# Patient Record
Sex: FEMALE | Race: WHITE | NOT HISPANIC OR LATINO | Employment: OTHER | URBAN - METROPOLITAN AREA
[De-identification: names, ages, dates, MRNs, and addresses within clinical notes are randomized per-mention and may not be internally consistent; named-entity substitution may affect disease eponyms.]

---

## 2021-07-11 PROBLEM — I10 BENIGN ESSENTIAL HYPERTENSION: Status: ACTIVE | Noted: 2021-07-11

## 2021-07-11 PROBLEM — J12.82 PNEUMONIA DUE TO COVID-19 VIRUS: Status: ACTIVE | Noted: 2021-07-11

## 2021-07-11 PROBLEM — U07.1 COVID-19: Status: ACTIVE | Noted: 2021-07-11

## 2021-07-11 PROBLEM — I48.91 ATRIAL FIBRILLATION: Status: ACTIVE | Noted: 2021-07-11

## 2021-07-11 PROBLEM — I63.9 CEREBROVASCULAR ACCIDENT (CVA): Status: ACTIVE | Noted: 2021-07-11

## 2021-07-11 PROBLEM — U07.1 PNEUMONIA DUE TO COVID-19 VIRUS: Status: ACTIVE | Noted: 2021-07-11

## 2021-07-15 DIAGNOSIS — U07.1 COVID-19 VIRUS DETECTED: ICD-10-CM

## 2021-07-15 PROBLEM — J12.82 PNEUMONIA DUE TO COVID-19 VIRUS: Status: RESOLVED | Noted: 2021-07-11 | Resolved: 2021-07-15

## 2021-08-06 ENCOUNTER — IMMUNIZATION (OUTPATIENT)
Dept: PRIMARY CARE CLINIC | Facility: CLINIC | Age: 71
End: 2021-08-06
Payer: MEDICARE

## 2021-08-06 DIAGNOSIS — Z23 NEED FOR VACCINATION: Primary | ICD-10-CM

## 2022-02-11 ENCOUNTER — IMMUNIZATION (OUTPATIENT)
Dept: PRIMARY CARE CLINIC | Facility: CLINIC | Age: 72
End: 2022-02-11
Payer: MEDICARE

## 2022-02-11 DIAGNOSIS — Z23 NEED FOR VACCINATION: Primary | ICD-10-CM

## 2022-02-11 PROCEDURE — 91305 COVID-19, MRNA, LNP-S, PF, 30 MCG/0.3 ML DOSE VACCINE (PFIZER): CPT | Mod: S$GLB,,, | Performed by: EMERGENCY MEDICINE

## 2022-02-11 PROCEDURE — 91305 COVID-19, MRNA, LNP-S, PF, 30 MCG/0.3 ML DOSE VACCINE (PFIZER): ICD-10-PCS | Mod: S$GLB,,, | Performed by: EMERGENCY MEDICINE

## 2024-06-17 ENCOUNTER — TELEPHONE (OUTPATIENT)
Dept: PODIATRY | Facility: CLINIC | Age: 74
End: 2024-06-17
Payer: MEDICARE

## 2024-06-17 NOTE — TELEPHONE ENCOUNTER
Scheduled her for 10/21/24 at 2:00pm Verbalized understanding and no further issues discussed. ----- Message from Rita Dale LPN sent at 6/17/2024  3:35 PM CDT -----  Regarding: FW: sonia ABURTO w/ Lex same day MRN  [id number]  Contact:  [phone]  I think you schedule the   ----- Message -----  From: Bell Lazaro  Sent: 6/17/2024   3:22 PM CDT  To: [facility]  Subject: sonia ABURTO w/ Lex same day MRN  [id number]#    The patient called requesting to scheduled with podiatry  PT & wife requestign to be scheduled together, both would be NP appts  I was unable to find appt for same day. Please reach out to advise/sched.     No further information provided    Patient can be contacted @# [phone]

## 2024-10-21 ENCOUNTER — OFFICE VISIT (OUTPATIENT)
Dept: PODIATRY | Facility: CLINIC | Age: 74
End: 2024-10-21
Payer: MEDICARE

## 2024-10-21 VITALS
BODY MASS INDEX: 30.56 KG/M2 | SYSTOLIC BLOOD PRESSURE: 114 MMHG | WEIGHT: 201.63 LBS | HEIGHT: 68 IN | HEART RATE: 90 BPM | DIASTOLIC BLOOD PRESSURE: 74 MMHG

## 2024-10-21 DIAGNOSIS — M79.674 PAIN IN TOE OF RIGHT FOOT: ICD-10-CM

## 2024-10-21 DIAGNOSIS — M20.41 HAMMERTOE OF SECOND TOE OF RIGHT FOOT: ICD-10-CM

## 2024-10-21 DIAGNOSIS — L60.0 ONYCHOMYCOSIS WITH INGROWN TOENAIL: Primary | ICD-10-CM

## 2024-10-21 DIAGNOSIS — M20.42 HAMMER TOES OF BOTH FEET: ICD-10-CM

## 2024-10-21 DIAGNOSIS — M20.41 HAMMER TOES OF BOTH FEET: ICD-10-CM

## 2024-10-21 DIAGNOSIS — B35.1 ONYCHOMYCOSIS WITH INGROWN TOENAIL: Primary | ICD-10-CM

## 2024-10-21 DIAGNOSIS — L84 CORN OR CALLUS: ICD-10-CM

## 2024-10-21 DIAGNOSIS — I69.30 SEQUELA OF THROMBOTIC CEREBROVASCULAR ACCIDENT (CVA): ICD-10-CM

## 2024-10-21 DIAGNOSIS — Z79.01 LONG TERM CURRENT USE OF ANTICOAGULANT: ICD-10-CM

## 2024-10-21 DIAGNOSIS — I87.2 EDEMA OF BOTH LOWER EXTREMITIES DUE TO PERIPHERAL VENOUS INSUFFICIENCY: ICD-10-CM

## 2024-10-21 PROCEDURE — 11056 PARNG/CUTG B9 HYPRKR LES 2-4: CPT | Mod: Q9,S$GLB,, | Performed by: PODIATRIST

## 2024-10-21 PROCEDURE — 1159F MED LIST DOCD IN RCRD: CPT | Mod: CPTII,S$GLB,, | Performed by: PODIATRIST

## 2024-10-21 PROCEDURE — 3074F SYST BP LT 130 MM HG: CPT | Mod: CPTII,S$GLB,, | Performed by: PODIATRIST

## 2024-10-21 PROCEDURE — 99999 PR PBB SHADOW E&M-EST. PATIENT-LVL III: CPT | Mod: PBBFAC,,, | Performed by: PODIATRIST

## 2024-10-21 PROCEDURE — 3008F BODY MASS INDEX DOCD: CPT | Mod: CPTII,S$GLB,, | Performed by: PODIATRIST

## 2024-10-21 PROCEDURE — 3288F FALL RISK ASSESSMENT DOCD: CPT | Mod: CPTII,S$GLB,, | Performed by: PODIATRIST

## 2024-10-21 PROCEDURE — 1125F AMNT PAIN NOTED PAIN PRSNT: CPT | Mod: CPTII,S$GLB,, | Performed by: PODIATRIST

## 2024-10-21 PROCEDURE — 99203 OFFICE O/P NEW LOW 30 MIN: CPT | Mod: 25,S$GLB,, | Performed by: PODIATRIST

## 2024-10-21 PROCEDURE — 11721 DEBRIDE NAIL 6 OR MORE: CPT | Mod: Q9,59,S$GLB, | Performed by: PODIATRIST

## 2024-10-21 PROCEDURE — 3078F DIAST BP <80 MM HG: CPT | Mod: CPTII,S$GLB,, | Performed by: PODIATRIST

## 2024-10-21 PROCEDURE — 1101F PT FALLS ASSESS-DOCD LE1/YR: CPT | Mod: CPTII,S$GLB,, | Performed by: PODIATRIST

## 2024-10-21 RX ORDER — PANTOPRAZOLE SODIUM 40 MG/1
40 TABLET, DELAYED RELEASE ORAL DAILY
COMMUNITY

## 2024-10-21 NOTE — PROGRESS NOTES
Subjective:      Patient ID: Verona Koroma is a 74 y.o. female.    Chief Complaint: Nail Care, Toe Pain (Right foot sore under her toes), and Callouses (Right great toe)    Verona is a 74 y.o. female who presents new to the podiatry clinic, accompanied by , w/ c/o B/L foot pain. Referred from [facility] friend - Rowena Rod & Scotty Abdi, who are both patients here. States has a corn under her toe R foot that hurts; pain level 10/10. Also nail care as they are hurting w/ shoes.    Doesn't drive.    PCP Js Barnard MD July 1st, 2024    Recent injury - broke shoulder R 6 months ago. Had afib after TKR 3 yrs.ago - on Eliquis.  Past Medical History:   Diagnosis Date    Hypertension     Stroke      Patient Active Problem List   Diagnosis    Atrial fibrillation    Benign essential hypertension    Cerebrovascular accident (CVA)      Objective:      Review of Systems   Constitutional: Positive for malaise/fatigue.   Cardiovascular:  Negative for leg swelling.   Skin:  Positive for color change, dry skin and suspicious lesions. Negative for rash.   Musculoskeletal:  Positive for falls, joint pain (fractured humerus 6/21/24), muscle weakness (R UE paralysis & RLE weakness) and myalgias.   Neurological:  Positive for focal weakness, paresthesias and sensory change. Negative for numbness.   Psychiatric/Behavioral:  The patient is not nervous/anxious.      Physical Exam  Vitals reviewed.   Constitutional:       Appearance: She is well-developed. She is obese.   Cardiovascular:      Pulses:           Dorsalis pedis pulses are 1+ on the right side and 1+ on the left side.      Comments: Telangectasias & varicose veins R>L  Musculoskeletal:         General: Tenderness present. No swelling or signs of injury.      Right lower leg: Edema present.      Left lower leg: Edema present.      Right foot: Deformity (hammertoes anabela. 2nd R) present.      Left foot: Deformity (hammertoes w/ underlapping 4th R) present.         Feet:    Feet:      Right foot:      Toenail Condition: Right toenails are long. Fungal disease present.     Left foot:      Toenail Condition: Left toenails are long. Fungal disease present.     Comments: Toenails 1st, 2nd, 3rd, 4th, 5th  B/L are hypertrophic, thickened, dystrophic, discolored.  Tender to distal nail plate pressure, w/out periungual skin abnormality noted.     Skin:     General: Skin is warm and dry.      Capillary Refill: Capillary refill takes 2 to 3 seconds.      Findings: Lesion present. No bruising or erythema.      Comments: Hyperkeratoses distal 4th L, medial 2nd R, IPK R HIPJ.    Edema non-pitting B/L./    Neurological:      Mental Status: She is alert and oriented to person, place, and time.      Motor: Weakness and abnormal muscle tone present.      Gait: Gait abnormal (triangle walker or cane.).   Psychiatric:         Mood and Affect: Mood and affect normal.         Behavior: Behavior normal. Behavior is cooperative.         Assessment:      Encounter Diagnoses   Name Primary?    Long term current use of anticoagulant     Sequela of thrombotic cerebrovascular accident (CVA)     Onychomycosis with ingrown toenail Yes    Edema of both lower extremities due to peripheral venous insufficiency     Hammer toes of both feet     Hammertoe of second toe of right foot     Corn or callus     Pain in toe of right foot        Problem List Items Addressed This Visit    None  Visit Diagnoses       Onychomycosis with ingrown toenail    -  Primary    Relevant Orders    Routine Foot Care    Long term current use of anticoagulant        Relevant Orders    Routine Foot Care    Sequela of thrombotic cerebrovascular accident (CVA)        Edema of both lower extremities due to peripheral venous insufficiency        Relevant Orders    Routine Foot Care    Hammer toes of both feet        Relevant Orders    CREST PADS FOR HOME USE    Hammertoe of second toe of right foot        Corn or callus         "Relevant Orders    Routine Foot Care    Pain in toe of right foot              Plan:       Verona Boyce" was seen today for nail care, toe pain and callouses.    Diagnoses and all orders for this visit:    Onychomycosis with ingrown toenail  -     Routine Foot Care    Long term current use of anticoagulant  -     Routine Foot Care    Sequela of thrombotic cerebrovascular accident (CVA)    Edema of both lower extremities due to peripheral venous insufficiency  -     Routine Foot Care    Hammer toes of both feet  -     CREST PADS FOR HOME USE    Hammertoe of second toe of right foot    Corn or callus  -     Routine Foot Care    Pain in toe of right foot    I counseled the patient on her conditions, their implications & medical mgmt.    - Shoe inspection. Patient instructed on proper foot hygeine. We discussed wearing proper shoe gear, daily foot inspections, never walking without protective shoe gear, never putting sharp instruments to feet, routine podiatric nail visits every 2-3 months.      - W/ patient's permission, nails were aggressively reduced & debrided x 10 to their soft tissue attachment mechanically, removing all offending nail & debris.  I trimmed the corns/ calluses at the above-mentioned location.      Patient relates relief following the procedure.   She will continue to monitor the areas daily, inspect her feet, wear protective shoe gear when ambulatory, moisturizer to maintain skin integrity & follow in this office in approximately 3 months, sooner p.r.n.        A total of 34 mins.was spent on chart review, patient visit & documentation.  "

## 2024-11-04 NOTE — PROCEDURES
Routine Foot Care    Date/Time: 10/21/2024 2:00 PM    Performed by: Rosalinda Dowd DPM  Authorized by: Rosalinda Dowd DPM    Consent Done?:  Yes (Verbal)  Hyperkeratotic Skin Lesions?: Yes    Number of trimmed lesions:  3  Location(s):  Right 2nd Toe, Right 1st Toe and Left 4th Toe    Nail Care Type:  Debride  Location(s): All  (Left 1st Toe, Left 3rd Toe, Left 2nd Toe, Left 4th Toe, Left 5th Toe, Right 1st Toe, Right 2nd Toe, Right 3rd Toe, Right 4th Toe and Right 5th Toe)  Patient tolerance:  Patient tolerated the procedure well with no immediate complications

## 2025-01-24 NOTE — PROGRESS NOTES
Subjective:      Patient ID: Verona Koroma is a 74 y.o. female.    Chief Complaint: Nail Care and Toe Pain (Under toe burn)    Patient is brought in by  for 3 month DM foot exam.  has 8-9 lbs of water weight but did not take fluid pills today. Also has lymphedema puymp but only used it yesterday. R leg swollen. Getting burning on ends of toes w/ walking since stroke; feels like warning on end of toes . Recent hypotension.  Pain level up till 10/10.  10/21/24  Verona is a 74 y.o. female who presents new to the podiatry clinic, accompanied by , w/ c/o B/L foot pain. Referred from [facility] friend - Rowena Velasco & Scotty Abdi, who are both patients here.  has a corn under her toe R foot that hurts; pain level 10/10. Also nail care as they are hurting w/ shoes.    Doesn't drive.    PCP sJ Barnard MD 10/22/24    Recent injury - broke shoulder R 6 months ago. Had afib after TKR 3 yrs.ago - on Eliquis.  Past Medical History:   Diagnosis Date    Hypertension     Stroke      Patient Active Problem List   Diagnosis    Atrial fibrillation    Benign essential hypertension    Cerebrovascular accident (CVA)      Objective:      Review of Systems   Constitutional: Positive for malaise/fatigue.   Cardiovascular:  Negative for leg swelling.   Skin:  Positive for color change, dry skin and suspicious lesions. Negative for rash.   Musculoskeletal:  Positive for falls, joint pain (fractured humerus 6/21/24), muscle weakness (R UE paralysis & RLE weakness) and myalgias.   Neurological:  Positive for focal weakness, paresthesias and sensory change. Negative for numbness.   Psychiatric/Behavioral:  The patient is not nervous/anxious.      Physical Exam  Vitals reviewed.   Constitutional:       Appearance: She is well-developed. She is obese.   Cardiovascular:      Pulses:           Dorsalis pedis pulses are 1+ on the right side and 1+ on the left side.      Comments: Telangectasias & varicose  veins R>>L.    Lymphedema  Musculoskeletal:         General: Tenderness present. No swelling or signs of injury.      Right lower leg: Edema present.      Left lower leg: Edema present.      Right foot: Deformity (hammertoes anabela. 2nd R) present.      Left foot: Deformity (hammertoes w/ underlapping 4th R) present.        Feet:    Feet:      Right foot:      Toenail Condition: Right toenails are long. Fungal disease present.     Left foot:      Toenail Condition: Left toenails are long. Fungal disease present.     Comments: Toenails 1st, 2nd, 3rd, 4th, 5th  B/L are hypertrophic, thickened, dystrophic, discolored.  Tender to distal nail plate pressure, w/out periungual skin abnormality noted.     Skin:     General: Skin is warm and dry.      Capillary Refill: Capillary refill takes 2 to 3 seconds.      Findings: Lesion present. No bruising or erythema.      Comments: Hyperkeratoses distal medial 2nd R, IPK R HIPJ.    Edema non-pitting B/L R>>L.    Neurological:      Mental Status: She is alert and oriented to person, place, and time.      Motor: Weakness and abnormal muscle tone present.      Gait: Gait abnormal (triangle walker or cane.).      Comments: Paresthesias B/L feet w/ no clearly identified trigger or source; no hyperemia.    No TTP & fullness IMS B/L & w/out increase on lateral met.head compression.     Psychiatric:         Mood and Affect: Mood and affect normal.         Behavior: Behavior normal. Behavior is cooperative.         Assessment:      Encounter Diagnoses   Name Primary?    Long term current use of anticoagulant Yes    Atrial fibrillation, unspecified type     History of stroke     Burning sensation of toe and foot     Lymphedema of lower extremity     Hemiplegia as late effect of stroke     Onychomycosis with ingrown toenail        Problem List Items Addressed This Visit          Cardiac/Vascular    Atrial fibrillation     Other Visit Diagnoses       Long term current use of anticoagulant    -   "Primary    Relevant Orders    Routine Foot Care    History of stroke        Relevant Orders    Routine Foot Care    Burning sensation of toe and foot        Lymphedema of lower extremity        Relevant Orders    Routine Foot Care    Hemiplegia as late effect of stroke        Onychomycosis with ingrown toenail        Relevant Orders    Routine Foot Care          Plan:       Verona Boyce" was seen today for nail care and toe pain.    Diagnoses and all orders for this visit:    Long term current use of anticoagulant  -     Routine Foot Care    Atrial fibrillation, unspecified type    History of stroke  -     Routine Foot Care    Burning sensation of toe and foot    Lymphedema of lower extremity  -     Routine Foot Care    Hemiplegia as late effect of stroke    Onychomycosis with ingrown toenail  -     Routine Foot Care    I counseled the patient on her conditions, their implications & medical mgmt.    - Shoe inspection. Patient instructed on proper foot hygeine. We discussed wearing proper shoe gear, daily foot inspections, never walking without protective shoe gear, never putting sharp instruments to feet, annual DM foot exam, sooner prn.    - W/ patient's permission, nails were aggressively reduced & debrided x 10 to their soft tissue attachment mechanically, removing all offending nail & debris.  I trimmed the corns/ calluses at the above-mentioned location.      Patient relates relief following the procedure.   She will continue to monitor the areas daily, inspect her feet, wear protective shoe gear when ambulatory, moisturizer to maintain skin integrity & follow in this office in approximately 3 months, sooner p.r.n.    Dispensed crest pads for hallux & 2nd toe.    Dictation #1  MRN:[id number]  CSN:[id number]     A total of 33 mins.was spent on chart review, patient visit & documentation.  "

## 2025-01-29 ENCOUNTER — OFFICE VISIT (OUTPATIENT)
Dept: PODIATRY | Facility: CLINIC | Age: 75
End: 2025-01-29
Payer: MEDICARE

## 2025-01-29 VITALS
SYSTOLIC BLOOD PRESSURE: 122 MMHG | WEIGHT: 198.63 LBS | DIASTOLIC BLOOD PRESSURE: 69 MMHG | HEIGHT: 68 IN | BODY MASS INDEX: 30.1 KG/M2 | HEART RATE: 88 BPM

## 2025-01-29 DIAGNOSIS — I48.91 ATRIAL FIBRILLATION, UNSPECIFIED TYPE: ICD-10-CM

## 2025-01-29 DIAGNOSIS — B35.1 ONYCHOMYCOSIS WITH INGROWN TOENAIL: ICD-10-CM

## 2025-01-29 DIAGNOSIS — I69.359 HEMIPLEGIA AS LATE EFFECT OF STROKE: ICD-10-CM

## 2025-01-29 DIAGNOSIS — Z86.73 HISTORY OF STROKE: ICD-10-CM

## 2025-01-29 DIAGNOSIS — L60.0 ONYCHOMYCOSIS WITH INGROWN TOENAIL: ICD-10-CM

## 2025-01-29 DIAGNOSIS — R20.8 BURNING SENSATION OF TOE AND FOOT: ICD-10-CM

## 2025-01-29 DIAGNOSIS — I89.0 LYMPHEDEMA OF LOWER EXTREMITY: ICD-10-CM

## 2025-01-29 DIAGNOSIS — Z79.01 LONG TERM CURRENT USE OF ANTICOAGULANT: Primary | ICD-10-CM

## 2025-01-29 PROCEDURE — 11721 DEBRIDE NAIL 6 OR MORE: CPT | Mod: Q9,S$GLB,, | Performed by: PODIATRIST

## 2025-01-29 PROCEDURE — 3078F DIAST BP <80 MM HG: CPT | Mod: CPTII,S$GLB,, | Performed by: PODIATRIST

## 2025-01-29 PROCEDURE — 3008F BODY MASS INDEX DOCD: CPT | Mod: CPTII,S$GLB,, | Performed by: PODIATRIST

## 2025-01-29 PROCEDURE — 3288F FALL RISK ASSESSMENT DOCD: CPT | Mod: CPTII,S$GLB,, | Performed by: PODIATRIST

## 2025-01-29 PROCEDURE — 3074F SYST BP LT 130 MM HG: CPT | Mod: CPTII,S$GLB,, | Performed by: PODIATRIST

## 2025-01-29 PROCEDURE — 1159F MED LIST DOCD IN RCRD: CPT | Mod: CPTII,S$GLB,, | Performed by: PODIATRIST

## 2025-01-29 PROCEDURE — 1101F PT FALLS ASSESS-DOCD LE1/YR: CPT | Mod: CPTII,S$GLB,, | Performed by: PODIATRIST

## 2025-01-29 PROCEDURE — 1125F AMNT PAIN NOTED PAIN PRSNT: CPT | Mod: CPTII,S$GLB,, | Performed by: PODIATRIST

## 2025-01-29 PROCEDURE — 99999 PR PBB SHADOW E&M-EST. PATIENT-LVL III: CPT | Mod: PBBFAC,,, | Performed by: PODIATRIST

## 2025-01-29 PROCEDURE — 99214 OFFICE O/P EST MOD 30 MIN: CPT | Mod: 25,S$GLB,, | Performed by: PODIATRIST

## 2025-02-01 NOTE — PROCEDURES
Routine Foot Care    Date/Time: 1/29/2025 2:30 PM    Performed by: Rosalinda Dowd DPM  Authorized by: Rosalinda Dowd DPM    Consent Done?:  Yes (Verbal)    Nail Care Type:  Debride  Location(s): All  (Left 1st Toe, Left 3rd Toe, Left 2nd Toe, Left 4th Toe, Left 5th Toe, Right 1st Toe, Right 2nd Toe, Right 3rd Toe, Right 4th Toe and Right 5th Toe)  Patient tolerance:  Patient tolerated the procedure well with no immediate complications

## 2025-04-24 NOTE — PROGRESS NOTES
Subjective:      Patient ID: Verona Koroma is a 74 y.o. female.    Chief Complaint: Nail Care and Joint Swelling (Right leg)    Patient is here for foot care, accompanied by her  who also has an appt. SHe is on an anticoagulant w/ h/o CVA. Losing weight so thinks reduced need for BP Rx; taken off meds.since Xmas day as BP was down to 40/35; now generally in control until this appt. C/o R ankle swelling but less than before, & corn/ callus big toes.  1/29/25  Patient is brought in by  for 3 month DM foot exam.  has 8-9 lbs of water weight but did not take fluid pills today. Also has lymphedema puymp but only used it yesterday. R leg swollen. Getting burning on ends of toes w/ walking since stroke; feels like warning on end of toes . Recent hypotension.  Pain level up till 10/10.  10/21/24  Verona is a 74 y.o. female who presents new to the podiatry clinic, accompanied by , w/ c/o B/L foot pain. Referred from [facility] friend - Rowena Velasco & Scotty Abdi, who are both patients here.  has a corn under her toe R foot that hurts; pain level 10/10. Also nail care as they are hurting w/ shoes.    Doesn't drive.    PCP Js Barnard MD 4/3/25    Recent injury - broke shoulder R 6 months ago. Had afib after TKR 3 yrs.ago - on Eliquis.  Past Medical History:   Diagnosis Date    Hypertension     Stroke      Patient Active Problem List   Diagnosis    Atrial fibrillation    Benign essential hypertension    Cerebrovascular accident (CVA)      Objective:      Review of Systems   Constitutional: Positive for malaise/fatigue.   Cardiovascular:  Negative for leg swelling.   Skin:  Positive for color change, dry skin and suspicious lesions. Negative for rash.   Musculoskeletal:  Positive for falls, joint pain (fractured humerus 6/21/24), muscle weakness (R UE paralysis & RLE weakness) and myalgias.   Neurological:  Positive for focal weakness, paresthesias and sensory change. Negative  for numbness.   Psychiatric/Behavioral:  The patient is not nervous/anxious.      Physical Exam  Vitals reviewed.   Constitutional:       Appearance: She is well-developed and overweight.   Cardiovascular:      Pulses:           Dorsalis pedis pulses are 1+ on the right side and 1+ on the left side.      Comments: Telangectasias & varicose veins R>>L.    Lymphedema R.  Musculoskeletal:         General: Tenderness present. No swelling or signs of injury.      Right lower leg: Edema present.      Left lower leg: Edema present.      Right foot: Deformity (hammertoes anabela. 2nd R) present.      Left foot: Deformity (hammertoes w/ underlapping 4th R) present.        Feet:    Feet:      Right foot:      Toenail Condition: Right toenails are long. Fungal disease present.     Left foot:      Toenail Condition: Left toenails are long. Fungal disease present.     Comments: Toenails 1st, 2nd, 3rd, 4th, 5th  B/L are hypertrophic, thickened, dystrophic, discolored & cryptotic.  Tender to distal nail plate pressure, w/out periungual skin abnormality noted.     Skin:     General: Skin is warm and dry.      Capillary Refill: Capillary refill takes 2 to 3 seconds.      Findings: Lesion present. No bruising or erythema.      Comments: Clavus distal medial 2nd R.  IPK B/L HIPJ.    Edema non-pitting B/L R>>L.   Venous stasis hyperpigmentation anterior tibia.   Neurological:      Mental Status: She is alert and oriented to person, place, and time.      Sensory: No sensory deficit.      Motor: Weakness and abnormal muscle tone present.      Gait: Gait abnormal (triangle walker or cane.).      Comments: Paresthesias B/L feet w/ no clearly identified trigger or source; no hyperemia.  No TTP & fullness IMS B/L & w/out increase on lateral met.head compression.     Psychiatric:         Mood and Affect: Mood and affect normal.         Behavior: Behavior normal. Behavior is cooperative.         Assessment:      Encounter Diagnoses   Name Primary?  "   Long term current use of anticoagulant     History of stroke     Hemiparesis affecting right side as late effect of cerebrovascular accident     Edema of both lower extremities due to peripheral venous insufficiency     Clavus     Corn or callus     Onychomycosis with ingrown toenail     Right ankle swelling Yes    Atrial fibrillation, unspecified type        Problem List Items Addressed This Visit          Cardiac/Vascular    Atrial fibrillation     Other Visit Diagnoses         Right ankle swelling    -  Primary      Long term current use of anticoagulant        Relevant Orders    Routine Foot Care      History of stroke          Hemiparesis affecting right side as late effect of cerebrovascular accident          Edema of both lower extremities due to peripheral venous insufficiency        Relevant Orders    Routine Foot Care      Clavus        Relevant Orders    Routine Foot Care      [place] or callus        Relevant Orders    Routine Foot Care      Onychomycosis with ingrown toenail        Relevant Orders    Routine Foot Care          Plan:       Verona Boyce" was seen today for nail care and joint swelling.    Diagnoses and all orders for this visit:    Right ankle swelling    Long term current use of anticoagulant  -     Routine Foot Care    History of stroke    Hemiparesis affecting right side as late effect of cerebrovascular accident    Edema of both lower extremities due to peripheral venous insufficiency  -     Routine Foot Care    Clavus  -     Routine Foot Care    [place] or callus  -     Routine Foot Care    Onychomycosis with ingrown toenail  -     Routine Foot Care    Atrial fibrillation, unspecified type      I counseled the patient on her conditions, their implications & medical mgmt.    - Shoe inspection. Patient instructed on proper foot hygeine. We discussed wearing proper shoe gear, daily foot inspections, never walking w/out protective shoe gear, annual DM foot exam, sooner prn.    - W/ " patient's permission, nails were aggressively reduced & debrided x 10 to their soft tissue attachment mechanically, removing all offending nail & debris.  I trimmed the corns/ calluses at the above-mentioned locations.      Patient relates relief following the procedure.   She will continue to monitor the areas daily, inspect her feet, wear protective shoe gear when ambulatory, moisturizer to maintain skin integrity & follow in this office in approximately 3 months, sooner p.r.n.        A total of 31 mins.was spent on chart review, patient visit & documentation.

## 2025-04-30 ENCOUNTER — OFFICE VISIT (OUTPATIENT)
Dept: PODIATRY | Facility: CLINIC | Age: 75
End: 2025-04-30
Payer: MEDICARE

## 2025-04-30 VITALS
BODY MASS INDEX: 28.53 KG/M2 | SYSTOLIC BLOOD PRESSURE: 163 MMHG | HEART RATE: 108 BPM | HEIGHT: 68 IN | WEIGHT: 188.25 LBS | DIASTOLIC BLOOD PRESSURE: 103 MMHG

## 2025-04-30 DIAGNOSIS — L84 CLAVUS: ICD-10-CM

## 2025-04-30 DIAGNOSIS — L60.0 ONYCHOMYCOSIS WITH INGROWN TOENAIL: ICD-10-CM

## 2025-04-30 DIAGNOSIS — I87.2 EDEMA OF BOTH LOWER EXTREMITIES DUE TO PERIPHERAL VENOUS INSUFFICIENCY: ICD-10-CM

## 2025-04-30 DIAGNOSIS — I69.351 HEMIPARESIS AFFECTING RIGHT SIDE AS LATE EFFECT OF CEREBROVASCULAR ACCIDENT: ICD-10-CM

## 2025-04-30 DIAGNOSIS — Z86.73 HISTORY OF STROKE: ICD-10-CM

## 2025-04-30 DIAGNOSIS — M25.471 RIGHT ANKLE SWELLING: Primary | ICD-10-CM

## 2025-04-30 DIAGNOSIS — L84 CORN OR CALLUS: ICD-10-CM

## 2025-04-30 DIAGNOSIS — I48.91 ATRIAL FIBRILLATION, UNSPECIFIED TYPE: ICD-10-CM

## 2025-04-30 DIAGNOSIS — B35.1 ONYCHOMYCOSIS WITH INGROWN TOENAIL: ICD-10-CM

## 2025-04-30 DIAGNOSIS — Z79.01 LONG TERM CURRENT USE OF ANTICOAGULANT: ICD-10-CM

## 2025-04-30 PROCEDURE — 99213 OFFICE O/P EST LOW 20 MIN: CPT | Mod: 25,S$GLB,, | Performed by: PODIATRIST

## 2025-04-30 PROCEDURE — 11056 PARNG/CUTG B9 HYPRKR LES 2-4: CPT | Mod: Q9,S$GLB,, | Performed by: PODIATRIST

## 2025-04-30 PROCEDURE — 1159F MED LIST DOCD IN RCRD: CPT | Mod: CPTII,S$GLB,, | Performed by: PODIATRIST

## 2025-04-30 PROCEDURE — 1100F PTFALLS ASSESS-DOCD GE2>/YR: CPT | Mod: CPTII,S$GLB,, | Performed by: PODIATRIST

## 2025-04-30 PROCEDURE — 3077F SYST BP >= 140 MM HG: CPT | Mod: CPTII,S$GLB,, | Performed by: PODIATRIST

## 2025-04-30 PROCEDURE — 99999 PR PBB SHADOW E&M-EST. PATIENT-LVL III: CPT | Mod: PBBFAC,,, | Performed by: PODIATRIST

## 2025-04-30 PROCEDURE — 3008F BODY MASS INDEX DOCD: CPT | Mod: CPTII,S$GLB,, | Performed by: PODIATRIST

## 2025-04-30 PROCEDURE — 3080F DIAST BP >= 90 MM HG: CPT | Mod: CPTII,S$GLB,, | Performed by: PODIATRIST

## 2025-04-30 PROCEDURE — 3288F FALL RISK ASSESSMENT DOCD: CPT | Mod: CPTII,S$GLB,, | Performed by: PODIATRIST

## 2025-04-30 PROCEDURE — 11721 DEBRIDE NAIL 6 OR MORE: CPT | Mod: XS,Q9,S$GLB, | Performed by: PODIATRIST

## 2025-04-30 PROCEDURE — 1126F AMNT PAIN NOTED NONE PRSNT: CPT | Mod: CPTII,S$GLB,, | Performed by: PODIATRIST

## 2025-05-06 NOTE — PROCEDURES
Routine Foot Care    Date/Time: 4/30/2025 2:45 PM    Performed by: Rosalinda Dowd DPM  Authorized by: Rosalinda Dowd DPM    Consent Done?:  Yes (Verbal)  Hyperkeratotic Skin Lesions?: Yes    Number of trimmed lesions:  3  Location(s):  Left 1st Toe, Right 1st Toe and Right 2nd Toe    Nail Care Type:  Debride  Location(s): All  (Left 1st Toe, Left 3rd Toe, Left 2nd Toe, Left 4th Toe, Left 5th Toe, Right 1st Toe, Right 2nd Toe, Right 3rd Toe, Right 4th Toe and Right 5th Toe)  Patient tolerance:  Patient tolerated the procedure well with no immediate complications

## 2025-07-22 NOTE — PROGRESS NOTES
Subjective:      Patient ID: Verona Koroma is a 75 y.o. female.    Chief Complaint: Nail Care    RLE on & off - prn lymphedema pump qd-bid since CVA.T    4/30/25  Patient is here for foot care, accompanied by her  who also has an appt. SHe is on an anticoagulant w/ h/o CVA. Losing weight so thinks reduced need for BP Rx; taken off meds.since Xmas day as BP was down to 40/35; now generally in control until this appt. C/o R ankle swelling but less than before, & corn/ callus big toes.  1/29/25  Patient is brought in by  for 3 month DM foot exam.  has 8-9 lbs of water weight but did not take fluid pills today. Also has lymphedema puymp but only used it yesterday. R leg swollen. Getting burning on ends of toes w/ walking since stroke; feels like warning on end of toes . Recent hypotension.  Pain level up till 10/10.  10/21/24  Verona is a 75 y.o. female who presents new to the podiatry clinic, accompanied by , w/ c/o B/L foot pain. Referred from [facility] friend - Rowena Velasco & Scotty Abdi, who are both patients here.  has a corn under her toe R foot that hurts; pain level 10/10. Also nail care as they are hurting w/ shoes.    Doesn't drive.    PCP Js Barnard MD 7/11/25    Recent injury - broke shoulder R 6 months ago. Had afib after TKR 3 yrs.ago - on Eliquis.  Past Medical History:   Diagnosis Date    Hypertension     Stroke      Patient Active Problem List   Diagnosis    Atrial fibrillation    Benign essential hypertension    Cerebrovascular accident (CVA)      Objective:      Review of Systems   Constitutional: Positive for malaise/fatigue.   Cardiovascular:  Negative for leg swelling.   Skin:  Positive for color change, dry skin and suspicious lesions. Negative for rash.   Musculoskeletal:  Positive for falls, joint pain (fractured humerus 6/21/24), muscle weakness (R UE paralysis & RLE weakness) and myalgias.   Neurological:  Positive for focal weakness,  paresthesias and sensory change. Negative for numbness.   Psychiatric/Behavioral:  The patient is not nervous/anxious.      Physical Exam  Vitals reviewed.   Constitutional:       Appearance: She is well-developed and overweight.   Cardiovascular:      Pulses:           Dorsalis pedis pulses are 1+ on the right side and 1+ on the left side.      Comments: Telangectasias & varicose veins R>>L.    Lymphedema R.  Musculoskeletal:         General: Tenderness present. No swelling or signs of injury.      Right lower le+ Edema present.      Left lower le+ Edema present.      Right foot: Deformity (hammertoes anabela. 2nd R) present.      Left foot: Deformity (hammertoes w/ underlapping 4th R) present.        Feet:    Feet:      Right foot:      Toenail Condition: Right toenails are long. Fungal disease present.     Left foot:      Toenail Condition: Left toenails are long. Fungal disease present.     Comments: Toenails 1st, 2nd, 3rd, 4th, 5th  B/L are hypertrophic, thickened, dystrophic, discolored & cryptotic.  Tender to distal nail plate pressure, w/out periungual skin abnormality noted.     Skin:     General: Skin is warm and dry.      Capillary Refill: Capillary refill takes 2 to 3 seconds.      Findings: Lesion present. No bruising or erythema.      Comments: Clavus distal medial 2nd R.  IPK B/L HIPJ.    Edema non-pitting B/L R>>L.   Venous stasis hyperpigmentation anterior tibia.   Neurological:      Mental Status: She is alert and oriented to person, place, and time.      Sensory: No sensory deficit.      Motor: Weakness and abnormal muscle tone present.      Gait: Gait abnormal (triangle walker or cane.).      Comments: Paresthesias B/L feet w/ no clearly identified trigger or source; no hyperemia.  No TTP & fullness IMS B/L & w/out increase on lateral met.head compression.     Psychiatric:         Mood and Affect: Mood and affect normal.         Behavior: Behavior normal. Behavior is cooperative.        "  Assessment:      Encounter Diagnoses   Name Primary?    Lymphedema of right lower extremity Yes    Onychomycosis with ingrown toenail     Sequela of thrombotic cerebrovascular accident (CVA)     Hemiparesis affecting right side as late effect of cerebrovascular accident     Long term current use of anticoagulant     Atrial fibrillation, unspecified type        Problem List Items Addressed This Visit          Cardiac/Vascular    Atrial fibrillation     Other Visit Diagnoses         Lymphedema of right lower extremity    -  Primary      Onychomycosis with ingrown toenail          Sequela of thrombotic cerebrovascular accident (CVA)          Hemiparesis affecting right side as late effect of cerebrovascular accident          Long term current use of anticoagulant              Plan:       Verona Boyce" was seen today for nail care.    Diagnoses and all orders for this visit:    Lymphedema of right lower extremity    Onychomycosis with ingrown toenail    Sequela of thrombotic cerebrovascular accident (CVA)    Hemiparesis affecting right side as late effect of cerebrovascular accident    Long term current use of anticoagulant    Atrial fibrillation, unspecified type        I counseled the patient on her conditions, their implications & medical mgmt.    - Shoe inspection. Patient instructed on proper foot hygeine. We discussed wearing proper shoe gear, daily foot inspections, never walking w/out protective shoe gear, annual DM foot exam, sooner prn.    - W/ patient's permission, nails were aggressively reduced & debrided x 10 to their soft tissue attachment mechanically, removing all offending nail & debris.  I trimmed the corns/ calluses at the above-mentioned locations.      Patient relates relief following the procedure.   She will continue to monitor the areas daily, inspect her feet, wear protective shoe gear when ambulatory, moisturizer to maintain skin integrity & follow in this office in approximately 3 months, " sooner p.r.n.        A total of 31 mins.was spent on chart review, patient visit & documentation.

## 2025-07-30 ENCOUNTER — OFFICE VISIT (OUTPATIENT)
Dept: PODIATRY | Facility: CLINIC | Age: 75
End: 2025-07-30
Payer: MEDICARE

## 2025-07-30 VITALS
DIASTOLIC BLOOD PRESSURE: 74 MMHG | HEART RATE: 76 BPM | HEIGHT: 68 IN | SYSTOLIC BLOOD PRESSURE: 127 MMHG | BODY MASS INDEX: 28.63 KG/M2

## 2025-07-30 DIAGNOSIS — L60.0 ONYCHOMYCOSIS WITH INGROWN TOENAIL: ICD-10-CM

## 2025-07-30 DIAGNOSIS — I69.30 SEQUELA OF THROMBOTIC CEREBROVASCULAR ACCIDENT (CVA): ICD-10-CM

## 2025-07-30 DIAGNOSIS — B35.1 ONYCHOMYCOSIS WITH INGROWN TOENAIL: ICD-10-CM

## 2025-07-30 DIAGNOSIS — I48.91 ATRIAL FIBRILLATION, UNSPECIFIED TYPE: ICD-10-CM

## 2025-07-30 DIAGNOSIS — I69.351 HEMIPARESIS AFFECTING RIGHT SIDE AS LATE EFFECT OF CEREBROVASCULAR ACCIDENT: ICD-10-CM

## 2025-07-30 DIAGNOSIS — I89.0 LYMPHEDEMA OF RIGHT LOWER EXTREMITY: Primary | ICD-10-CM

## 2025-07-30 DIAGNOSIS — Z79.01 LONG TERM CURRENT USE OF ANTICOAGULANT: ICD-10-CM

## 2025-07-30 PROCEDURE — 99999 PR PBB SHADOW E&M-EST. PATIENT-LVL III: CPT | Mod: PBBFAC,,, | Performed by: PODIATRIST

## 2025-07-30 PROCEDURE — 3078F DIAST BP <80 MM HG: CPT | Mod: CPTII,S$GLB,, | Performed by: PODIATRIST

## 2025-07-30 PROCEDURE — 3288F FALL RISK ASSESSMENT DOCD: CPT | Mod: CPTII,S$GLB,, | Performed by: PODIATRIST

## 2025-07-30 PROCEDURE — 3008F BODY MASS INDEX DOCD: CPT | Mod: CPTII,S$GLB,, | Performed by: PODIATRIST

## 2025-07-30 PROCEDURE — 3074F SYST BP LT 130 MM HG: CPT | Mod: CPTII,S$GLB,, | Performed by: PODIATRIST

## 2025-07-30 PROCEDURE — 1101F PT FALLS ASSESS-DOCD LE1/YR: CPT | Mod: CPTII,S$GLB,, | Performed by: PODIATRIST

## 2025-07-30 PROCEDURE — 1125F AMNT PAIN NOTED PAIN PRSNT: CPT | Mod: CPTII,S$GLB,, | Performed by: PODIATRIST

## 2025-07-30 PROCEDURE — 99213 OFFICE O/P EST LOW 20 MIN: CPT | Mod: S$GLB,,, | Performed by: PODIATRIST

## 2025-07-30 PROCEDURE — 1159F MED LIST DOCD IN RCRD: CPT | Mod: CPTII,S$GLB,, | Performed by: PODIATRIST

## 2025-07-30 RX ORDER — TORSEMIDE 20 MG/1
20 TABLET ORAL
COMMUNITY
Start: 2025-05-04

## 2025-07-30 RX ORDER — DILTIAZEM HYDROCHLORIDE 180 MG/1
180 CAPSULE, COATED, EXTENDED RELEASE ORAL 2 TIMES DAILY
COMMUNITY
Start: 2025-07-08